# Patient Record
Sex: MALE | Race: WHITE | HISPANIC OR LATINO
[De-identification: names, ages, dates, MRNs, and addresses within clinical notes are randomized per-mention and may not be internally consistent; named-entity substitution may affect disease eponyms.]

---

## 2022-04-21 PROBLEM — Z00.00 ENCOUNTER FOR PREVENTIVE HEALTH EXAMINATION: Status: ACTIVE | Noted: 2022-04-21

## 2022-04-27 ENCOUNTER — APPOINTMENT (OUTPATIENT)
Dept: CARDIOLOGY | Facility: CLINIC | Age: 62
End: 2022-04-27
Payer: MEDICAID

## 2022-04-27 ENCOUNTER — NON-APPOINTMENT (OUTPATIENT)
Age: 62
End: 2022-04-27

## 2022-04-27 VITALS
WEIGHT: 191 LBS | SYSTOLIC BLOOD PRESSURE: 130 MMHG | BODY MASS INDEX: 28.95 KG/M2 | OXYGEN SATURATION: 98 % | TEMPERATURE: 98.4 F | HEART RATE: 73 BPM | HEIGHT: 68 IN | DIASTOLIC BLOOD PRESSURE: 72 MMHG

## 2022-04-27 VITALS — DIASTOLIC BLOOD PRESSURE: 70 MMHG | SYSTOLIC BLOOD PRESSURE: 132 MMHG

## 2022-04-27 DIAGNOSIS — Z84.89 FAMILY HISTORY OF OTHER SPECIFIED CONDITIONS: ICD-10-CM

## 2022-04-27 DIAGNOSIS — Z83.3 FAMILY HISTORY OF DIABETES MELLITUS: ICD-10-CM

## 2022-04-27 DIAGNOSIS — Z80.9 FAMILY HISTORY OF MALIGNANT NEOPLASM, UNSPECIFIED: ICD-10-CM

## 2022-04-27 DIAGNOSIS — Z86.79 PERSONAL HISTORY OF OTHER DISEASES OF THE CIRCULATORY SYSTEM: ICD-10-CM

## 2022-04-27 DIAGNOSIS — Z86.39 PERSONAL HISTORY OF OTHER ENDOCRINE, NUTRITIONAL AND METABOLIC DISEASE: ICD-10-CM

## 2022-04-27 DIAGNOSIS — Z78.9 OTHER SPECIFIED HEALTH STATUS: ICD-10-CM

## 2022-04-27 DIAGNOSIS — Z87.438 PERSONAL HISTORY OF OTHER DISEASES OF MALE GENITAL ORGANS: ICD-10-CM

## 2022-04-27 PROCEDURE — 93000 ELECTROCARDIOGRAM COMPLETE: CPT

## 2022-04-27 PROCEDURE — 99203 OFFICE O/P NEW LOW 30 MIN: CPT

## 2022-04-27 RX ORDER — MULTIVITAMIN
TABLET ORAL DAILY
Qty: 30 | Refills: 2 | Status: ACTIVE | COMMUNITY
Start: 2022-04-27

## 2022-04-27 RX ORDER — TAMSULOSIN HYDROCHLORIDE 0.4 MG/1
0.4 CAPSULE ORAL
Qty: 30 | Refills: 5 | Status: ACTIVE | COMMUNITY
Start: 2022-04-27

## 2022-04-27 NOTE — REASON FOR VISIT
[Symptom and Test Evaluation] : symptom and test evaluation [FreeTextEntry1] : Establish care for HTN

## 2022-04-27 NOTE — DISCUSSION/SUMMARY
[FreeTextEntry1] : Mr. ERASMO GOMEZ is a 61 year male with metabolic Sdme. No prior history of CAD.\par At the present time he is completely asymptomatic.\par I have recommended to continue the same medications.\par I also recommended lifestyle modification with weight loss and exercise.\par We will perform a nuclear stress test and an echocardiogram to assess for ischemia, left ventricular and valvular function.\par We will perform routine laboratory tests\par Routine follow up in 6 months\par

## 2022-04-27 NOTE — ASSESSMENT
[FreeTextEntry1] : ECG performed today at the office revealed a NSR 74 bpm, RSR in V1, with normal AQRS, CO, QRS and QTc.\par

## 2022-04-27 NOTE — HISTORY OF PRESENT ILLNESS
[FreeTextEntry1] : 62 yo man, , father of 4, Turkish speaking from [facility].  as a profession. No prior history of heart disease, has known HTN since the age of 49, has DM 2 treated with insulin for the last 4 years. Has been treated for hyperlipidemia. \par Had a stress test and TTE in the past, more than 4-5 years. All tests at the time were normal (?).\par Has noticed that when he does exercise, he gets SOB, NYHA 2/4. Denies CP, orthopnea, PND, palpitations or ankle swelling.\par HTN treated with ARB/ HCTZ\par DM 2 since age 44, treated with insulin since age 47\par HLD treated with statins\par No surgeries.\par Doesnt smoke\par Occasional alcohol\par Denies the use of illicit drugs\par Received the COVID 19 vaccine x 3\par No family history of IHD

## 2022-05-09 ENCOUNTER — APPOINTMENT (OUTPATIENT)
Dept: CARDIOLOGY | Facility: CLINIC | Age: 62
End: 2022-05-09
Payer: MEDICAID

## 2022-05-09 PROCEDURE — 93306 TTE W/DOPPLER COMPLETE: CPT

## 2022-05-17 ENCOUNTER — APPOINTMENT (OUTPATIENT)
Dept: CARDIOLOGY | Facility: CLINIC | Age: 62
End: 2022-05-17

## 2022-07-12 ENCOUNTER — APPOINTMENT (OUTPATIENT)
Dept: CARDIOLOGY | Facility: CLINIC | Age: 62
End: 2022-07-12

## 2022-07-12 PROCEDURE — 78452 HT MUSCLE IMAGE SPECT MULT: CPT

## 2022-07-12 PROCEDURE — 93015 CV STRESS TEST SUPVJ I&R: CPT

## 2022-07-12 PROCEDURE — A9500: CPT

## 2022-07-19 ENCOUNTER — APPOINTMENT (OUTPATIENT)
Dept: CARDIOLOGY | Facility: CLINIC | Age: 62
End: 2022-07-19

## 2022-07-19 VITALS
HEIGHT: 68 IN | BODY MASS INDEX: 28.95 KG/M2 | TEMPERATURE: 97.9 F | HEART RATE: 69 BPM | OXYGEN SATURATION: 96 % | DIASTOLIC BLOOD PRESSURE: 78 MMHG | WEIGHT: 191 LBS | SYSTOLIC BLOOD PRESSURE: 137 MMHG

## 2022-07-19 PROCEDURE — 99212 OFFICE O/P EST SF 10 MIN: CPT

## 2022-07-19 NOTE — DISCUSSION/SUMMARY
[FreeTextEntry1] : Mr. ERASMO GOMEZ is a 61 year male with metabolic Sdme. No prior history of CAD.\par At the present time he is completely asymptomatic.\par Had a stress test and echo that were both WNL.\par Blood work revealed elevated JvU6c=2.5% and reduced GFR (51 ml/min)\par I have recommended to continue the same medications.\par I also recommended lifestyle modification with weight loss and exercise.\par Routine follow up in 6 months\par

## 2022-07-19 NOTE — HISTORY OF PRESENT ILLNESS
[FreeTextEntry1] : 62 yo man, , father of 4, [place] speaking from [facility].  as a profession. No prior history of heart disease, has known HTN since the age of 49, has DM 2 treated with insulin for the last 4 years. Has been treated for hyperlipidemia. \par Had a stress test and TTE in the past, more than 4-5 years. All tests at the time were normal (?).\par On 7/11/2022 he had a nuclear stress test 11 METS normal LVEF and no ischemia.\par Echo done in 5/2022 was also WNL\par Has noticed that when he does exercise, he gets SOB, NYHA 2/4. Denies CP, orthopnea, PND, palpitations or ankle swelling.\par HTN treated with ARB/ HCTZ\par DM 2 since age 44, treated with insulin since age 47. Last HbA1c in 5/2022 was 8.5%\par Hyperlipidemia treated with statins. Lpa is also elevated 133 nmol/L (Nl<75)\par Mild CKD with a GFR=52 ml/min\par No surgeries.\par Doesnt smoke\par Occasional alcohol\par Denies the use of illicit drugs\par Received the COVID 19 vaccine x 3\par No family history of IHD

## 2023-02-01 ENCOUNTER — NON-APPOINTMENT (OUTPATIENT)
Age: 63
End: 2023-02-01

## 2023-02-01 ENCOUNTER — APPOINTMENT (OUTPATIENT)
Dept: CARDIOLOGY | Facility: CLINIC | Age: 63
End: 2023-02-01
Payer: MEDICAID

## 2023-02-01 VITALS
BODY MASS INDEX: 29.1 KG/M2 | HEIGHT: 68 IN | SYSTOLIC BLOOD PRESSURE: 138 MMHG | TEMPERATURE: 98 F | OXYGEN SATURATION: 97 % | WEIGHT: 192 LBS | HEART RATE: 71 BPM | DIASTOLIC BLOOD PRESSURE: 76 MMHG

## 2023-02-01 PROCEDURE — 99212 OFFICE O/P EST SF 10 MIN: CPT | Mod: 25

## 2023-02-01 PROCEDURE — 93000 ELECTROCARDIOGRAM COMPLETE: CPT

## 2023-02-01 RX ORDER — LOSARTAN POTASSIUM 50 MG/1
50 TABLET, FILM COATED ORAL DAILY
Qty: 30 | Refills: 0 | Status: DISCONTINUED | COMMUNITY
Start: 2022-04-27 | End: 2023-02-01

## 2023-02-01 NOTE — HISTORY OF PRESENT ILLNESS
[FreeTextEntry1] : 63 yo man, , father of 4, Faroese speaking from [facility].  as a profession. No prior history of heart disease, has known HTN since the age of 49, has DM 2 treated with insulin for the last 4 years. Has been treated for hyperlipidemia. \par Had a stress test and TTE in the past, more than 4-5 years. All tests at the time were normal (?).\par On 7/11/2022 he had a nuclear stress test 11 METS normal LVEF and no ischemia.\par Echo done in 5/2022 was also WNL\par Has noticed that when he does exercise, he gets SOB, NYHA 2/4. Denies CP, orthopnea, PND, palpitations or ankle swelling.\par Has been having trouble sleeping lately. \par HTN treated with ARB/ HCTZ\par DM 2 since age 44, treated with insulin since age 47. Last HbA1c in 5/2022 was 8.5%\par Hyperlipidemia treated with statins. Lpa is also elevated 133 nmol/L (Nl<75)\par Mild CKD with a GFR=52 ml/min\par No surgeries.\par Doesnt smoke\par Occasional alcohol\par Denies the use of illicit drugs\par Received the COVID 19 vaccine x 3\par No family history of IHD

## 2023-02-01 NOTE — ASSESSMENT
[FreeTextEntry1] : ECG performed today at the office revealed a NSR, RSR in V1, with normal AQRS, NE, QRS and QTc.\par

## 2023-02-01 NOTE — DISCUSSION/SUMMARY
[FreeTextEntry1] : Mr. ERASMO GOMEZ is a 62 year male with metabolic Sdme. No prior history of CAD.\par At the present time he is completely asymptomatic.\par Had a stress test and echo that were both WNL.\par Blood work revealed elevated MaL4m=3.5% and reduced GFR (51 ml/min)\par I have recommended to continue the same medications.\par I also recommended lifestyle modification with weight loss and exercise.\par Routine follow up in 6 months\par

## 2023-05-02 ENCOUNTER — APPOINTMENT (OUTPATIENT)
Dept: ENDOCRINOLOGY | Facility: CLINIC | Age: 63
End: 2023-05-02
Payer: MEDICAID

## 2023-05-02 VITALS
DIASTOLIC BLOOD PRESSURE: 75 MMHG | OXYGEN SATURATION: 97 % | HEART RATE: 70 BPM | HEIGHT: 68 IN | WEIGHT: 193 LBS | RESPIRATION RATE: 15 BRPM | TEMPERATURE: 98 F | BODY MASS INDEX: 29.25 KG/M2 | SYSTOLIC BLOOD PRESSURE: 146 MMHG

## 2023-05-02 LAB — HBA1C MFR BLD HPLC: 7.2

## 2023-05-02 PROCEDURE — 83036 HEMOGLOBIN GLYCOSYLATED A1C: CPT | Mod: QW

## 2023-05-02 PROCEDURE — T1013A: CUSTOM

## 2023-05-02 PROCEDURE — 99205 OFFICE O/P NEW HI 60 MIN: CPT | Mod: 25

## 2023-05-02 RX ORDER — INSULIN GLARGINE 100 [IU]/ML
100 INJECTION, SOLUTION SUBCUTANEOUS AS DIRECTED
Refills: 0 | Status: COMPLETED | COMMUNITY
Start: 2022-04-27 | End: 2023-05-02

## 2023-05-03 LAB
CREAT SPEC-SCNC: 107 MG/DL
MICROALBUMIN 24H UR DL<=1MG/L-MCNC: 2.8 MG/DL
MICROALBUMIN/CREAT 24H UR-RTO: 26 MG/G

## 2023-08-30 ENCOUNTER — APPOINTMENT (OUTPATIENT)
Dept: CARDIOLOGY | Facility: CLINIC | Age: 63
End: 2023-08-30
Payer: MEDICAID

## 2023-08-30 VITALS
HEIGHT: 68 IN | HEART RATE: 71 BPM | OXYGEN SATURATION: 96 % | BODY MASS INDEX: 27.58 KG/M2 | SYSTOLIC BLOOD PRESSURE: 142 MMHG | DIASTOLIC BLOOD PRESSURE: 68 MMHG | WEIGHT: 182 LBS | TEMPERATURE: 98 F

## 2023-08-30 PROCEDURE — 99213 OFFICE O/P EST LOW 20 MIN: CPT

## 2023-08-30 RX ORDER — GLIMEPIRIDE 4 MG/1
4 TABLET ORAL
Qty: 90 | Refills: 2 | Status: DISCONTINUED | COMMUNITY
Start: 2022-04-27 | End: 2023-08-30

## 2023-08-30 RX ORDER — IRBESARTAN 150 MG/1
150 TABLET ORAL DAILY
Refills: 0 | Status: ACTIVE | COMMUNITY

## 2023-08-30 NOTE — HISTORY OF PRESENT ILLNESS
[FreeTextEntry1] : 62 yo man, , father of 4, English speaking from [facility].  as a profession. No prior history of heart disease, has known HTN since the age of 49, has DM 2 treated with insulin for the last 4 years. Has been treated for hyperlipidemia.  Had a stress test and TTE in the past, more than 4-5 years. All tests at the time were normal (?). On 7/11/2022 he had a nuclear stress test 11 METS normal LVEF and no ischemia. Echo done in 5/2022 was also WNL Has noticed that when he does exercise, he gets SOB, NYHA 2/4. Denies CP, orthopnea, PND, palpitations or ankle swelling. Ocasional dizziness when standing up suddenly.  Has been having trouble sleeping lately.  HTN treated with ARB/ HCTZ DM 2 since age 44, treated with insulin since age 47. Last HbA1c in 5/2022 was 8.5% Hyperlipidemia treated with statins. Lpa is also elevated 133 nmol/L (Nl<75) Mild CKD with a GFR=52 ml/min No surgeries. Doesnt smoke Occasional alcohol Denies the use of illicit drugs Received the COVID 19 vaccine x 3 No family history of IHD

## 2023-08-30 NOTE — DISCUSSION/SUMMARY
[FreeTextEntry1] : Mr. ERASMO GOMEZ is a 63-year male with metabolic Sdme. No prior history of CAD. At the present time he is completely asymptomatic. Occasional dizziness when standing up. Had a stress test and echo that were both WNL. I have recommended to continue the same medications. I also recommended lifestyle modification with weight loss and exercise. Routine follow up in 6 months.

## 2023-08-30 NOTE — ASSESSMENT
[FreeTextEntry1] : ECG performed today at the office revealed a NSR 66 bpm, RSR in V1, with normal AQRS, MT, QRS and QTc.

## 2023-09-07 ENCOUNTER — APPOINTMENT (OUTPATIENT)
Dept: ENDOCRINOLOGY | Facility: CLINIC | Age: 63
End: 2023-09-07
Payer: MEDICAID

## 2023-09-07 VITALS
BODY MASS INDEX: 27.89 KG/M2 | HEART RATE: 86 BPM | DIASTOLIC BLOOD PRESSURE: 67 MMHG | SYSTOLIC BLOOD PRESSURE: 119 MMHG | HEIGHT: 68 IN | RESPIRATION RATE: 14 BRPM | TEMPERATURE: 98.1 F | OXYGEN SATURATION: 97 % | WEIGHT: 184 LBS

## 2023-09-07 DIAGNOSIS — E66.3 OVERWEIGHT: ICD-10-CM

## 2023-09-07 PROCEDURE — T1013A: CUSTOM

## 2023-09-07 PROCEDURE — 99214 OFFICE O/P EST MOD 30 MIN: CPT | Mod: 25

## 2023-09-07 RX ORDER — INSULIN GLARGINE 100 [IU]/ML
100 INJECTION, SOLUTION SUBCUTANEOUS
Qty: 3 | Refills: 1 | Status: ACTIVE | COMMUNITY

## 2023-09-07 RX ORDER — FLASH GLUCOSE SENSOR
KIT MISCELLANEOUS
Qty: 2 | Refills: 3 | Status: ACTIVE | COMMUNITY
Start: 2023-05-02 | End: 1900-01-01

## 2023-09-07 NOTE — PHYSICAL EXAM
[Alert] : alert [Well Nourished] : well nourished [No Acute Distress] : no acute distress [Normal Hearing] : hearing was normal [No Neck Mass] : no neck mass was observed [Normal Rate] : heart rate was normal [Not Tender] : non-tender [Normal Gait] : normal gait [No Rash] : no rash [Right foot was examined, including] : right foot ~C was examined, including visual inspection with sensory and pulse exams [Left foot was examined, including] : left foot ~C was examined, including visual inspection with sensory and pulse exams [No Tremors] : no tremors [Oriented x3] : oriented to person, place, and time [Normal Affect] : the affect was normal [Normal Mood] : the mood was normal

## 2023-09-07 NOTE — REASON FOR VISIT
[Follow - Up] : a follow-up visit [DM Type 2] : DM Type 2 [[employer] ] : provided by [employer]   [Time Spent: ____ minutes] : Total time spent using  services: [unfilled] minutes. The patient's primary language is not English thus required  services. [Interpreters_IDNumber] : [id number]

## 2023-09-07 NOTE — DATA REVIEWED
[FreeTextEntry1] : [facility] labs from May 2022 reviewed.  Aug 2023 [facility]: TSH 1.25, Calcium 9.1

## 2023-09-07 NOTE — REVIEW OF SYSTEMS
Rx sent in  Per Marifer's note below, please contact homecare to  the medication  Thanks   [Negative] : Heme/Lymph

## 2023-09-07 NOTE — HISTORY OF PRESENT ILLNESS
[FreeTextEntry1] :  is presenting for follow up care for his Diabetes.   used.   63 year old male with PMH of Diabetic retinopathy, CKD stage 4, HLD, mild anemia, Type 2 Diabetes since , BMI 29.  Reports no history of nephropathy or neuropathy.  Reports no history of cardiovascular risk factors, no history of CAD, CHF or CVA in the past. Evaluated by cardiologist end of .   Current DM meds: Glimepiride 4mg QD, Metformin 1000mg BID, Ozempic 2mg weekly, Glargine 15 units HS (3 to 4 years) Prior DM meds: Was on short acting insulin  Other pertinent meds:   POCT A1c%: 8.5% May 2022 --> 7.2% 2023 --> 7.3% 2023  POCT B, fasting at home   FSG: twice daily. Glucometer.  Pre-Breakfast: 98, 120, 150 Pre-Lunch:  Pre-Dinner:  Bedtime: 140 or 130 Hypoglycemic episodes: 70s (has symptoms of blurry vision) around 2-3am  Diet:  Breakfast: eggs, coffee, tortilla  Lunch: sandwiches, fast food or salad  Dinner: Tries to eat healthy  Snacks: No juice or soda. Fruit: orange, apple, banana  Exercise:  Urine micro: Mat  wnl ACE: irbesarten  C-peptide:  Cr: 1.20 GFR: 68 Aug 2023  Lipid profile: LDL 67, TGs 183 Aug 2023 Statin: atorvastatin 10mg  HbA1c%: 7.3%  Ophthalmology: Sees retinal specialist, gets injections in eye monthly due to HTN/DR.  Neuropathy:  Podiatry/Diabetic foot exam:     Interval hx:  Pt c/o dizziness, PCP lowered Glargine further from 18 to 15 units HS.

## 2023-09-07 NOTE — ASSESSMENT
[Diabetes Foot Care] : diabetes foot care [Long Term Vascular Complications] : long term vascular complications of diabetes [Carbohydrate Consistent Diet] : carbohydrate consistent diet [Importance of Diet and Exercise] : importance of diet and exercise to improve glycemic control, achieve weight loss and improve cardiovascular health [Exercise/Effect on Glucose] : exercise/effect on glucose [Hypoglycemia Management] : hypoglycemia management [Ketone Testing] : ketone testing [Action and use of Insulin] : action and use of short and long-acting insulin [Self Monitoring of Blood Glucose] : self monitoring of blood glucose [Insulin Self-Administration] : insulin self-administration [Injection Technique, Storage, Sharps Disposal] : injection technique, storage, and sharps disposal [Retinopathy Screening] : Patient was referred to ophthalmology for retinopathy screening [Diabetic Medications] : Risks and benefits of diabetic medications were discussed [FreeTextEntry1] : 62 year old male with PMH of Diabetic retinopathy, CKD stage 4, HLD, mild anemia, Type 2 Diabetes since 1995, BMI 29 is presenting for follow up care for his Diabetes.   1. Type 2 diabetes mellitus. Point-of-care HbA1c 7.2% April 2023 --> 7.3% Aug 2023  -We discussed the cardiovascular and microvascular complications of uncontrolled diabetes. We discussed the importance of diet and exercise and lifestyle modification for glycemic control and weight loss. We discussed referral to our diabetes educator and nutrition. We discussed pharmacologic options for glycemic control and weight loss.  -Restart Glimepiride but lower dose 2mg QD -Continue Lantus 15 units HS (Will try to continue to wean off) -Continue Ozempic 2mg weekly. No h/o MTC, MEN2 or pancreatitis. GI s/e discussed.  -Continue Metformin 1000mg BID -Due to 2-3am hypoglycemia, can decrease Glargine from 20 to 18 units HS  -Opthal UTD  -Urine ACR wnl  -Continue statin for HLD. LDL at goal.  -Foot care discussed  Patient verbalized understanding of the above. All questions were answered to patient's satisfaction. Dispo: Patient will follow up in 4 months.

## 2024-01-09 ENCOUNTER — APPOINTMENT (OUTPATIENT)
Dept: ENDOCRINOLOGY | Facility: CLINIC | Age: 64
End: 2024-01-09

## 2024-01-10 ENCOUNTER — APPOINTMENT (OUTPATIENT)
Dept: ENDOCRINOLOGY | Facility: CLINIC | Age: 64
End: 2024-01-10

## 2024-02-02 ENCOUNTER — RX RENEWAL (OUTPATIENT)
Age: 64
End: 2024-02-02

## 2024-02-02 RX ORDER — GLIMEPIRIDE 2 MG/1
2 TABLET ORAL
Qty: 30 | Refills: 5 | Status: ACTIVE | COMMUNITY
Start: 2023-09-07 | End: 1900-01-01

## 2024-03-13 ENCOUNTER — NON-APPOINTMENT (OUTPATIENT)
Age: 64
End: 2024-03-13

## 2024-03-13 ENCOUNTER — APPOINTMENT (OUTPATIENT)
Dept: CARDIOLOGY | Facility: CLINIC | Age: 64
End: 2024-03-13
Payer: MEDICAID

## 2024-03-13 VITALS — HEART RATE: 77 BPM | OXYGEN SATURATION: 97 % | DIASTOLIC BLOOD PRESSURE: 74 MMHG | SYSTOLIC BLOOD PRESSURE: 130 MMHG

## 2024-03-13 VITALS — BODY MASS INDEX: 28.64 KG/M2 | WEIGHT: 189 LBS | HEIGHT: 68 IN

## 2024-03-13 PROCEDURE — 99213 OFFICE O/P EST LOW 20 MIN: CPT

## 2024-03-13 NOTE — DISCUSSION/SUMMARY
[FreeTextEntry1] : Mr. ERASMO GOMEZ is a 63-year male with metabolic Sdme. No prior history of CAD. Has DM2 on insulin, HTN, HLD and overweight (Met Sdme). Therefore, will assess for CAD with a CTCA.  Occasional dizziness when standing up. I have recommended to continue the same medications. I also recommended lifestyle modification with weight loss and exercise. Routine follow up in 6 months.

## 2024-03-13 NOTE — ASSESSMENT
[FreeTextEntry1] : ECG performed today at the office revealed a NSR 72 bpm, RSR in V1, with normal AQRS, UT, QRS and QTc.

## 2024-03-13 NOTE — HISTORY OF PRESENT ILLNESS
[FreeTextEntry1] : 62 yo man, , father of 4, [place] speaking from [facility].  as a profession. No prior history of heart disease, has known HTN since the age of 49, has DM 2 treated with insulin for the last 4 years. Has been treated for hyperlipidemia.  Had a stress test and TTE in the past, more than 4-5 years. All tests at the time were normal (?). On 7/11/2022 he had a nuclear stress test 11 METS normal LVEF and no ischemia. Echo done in 5/2022 was also WNL. LVEFWNL Has noticed that when he does exercise, he gets SOB, NYHA 2/4. Denies CP, orthopnea, PND, palpitations or ankle swelling. Ocasional dizziness when standing up suddenly.  Has been having trouble sleeping lately.  HTN treated with ARB/ HCTZ DM 2 since age 44, treated with insulin since age 47. Last HbA1c in 5/2022 was 8.5% Hyperlipidemia treated with statins. Lpa is also elevated 133 nmol/L (Nl<75) Mild CKD with a GFR=52 ml/min No surgeries. Doesnt smoke Occasional alcohol Denies the use of illicit drugs Received the COVID 19 vaccine x 3 No family history of IHD

## 2024-03-13 NOTE — PHYSICAL EXAM
[Well Developed] : well developed [Well Nourished] : well nourished [No Acute Distress] : no acute distress [Normal Venous Pressure] : normal venous pressure [Normal Conjunctiva] : normal conjunctiva [No Carotid Bruit] : no carotid bruit [No Murmur] : no murmur [Normal S1, S2] : normal S1, S2 [No Gallop] : no gallop [No Rub] : no rub [Good Air Entry] : good air entry [Clear Lung Fields] : clear lung fields [Soft] : abdomen soft [No Respiratory Distress] : no respiratory distress  [Non Tender] : non-tender [No Masses/organomegaly] : no masses/organomegaly [Normal Gait] : normal gait [Normal Bowel Sounds] : normal bowel sounds [No Cyanosis] : no cyanosis [No Edema] : no edema [No Clubbing] : no clubbing [No Rash] : no rash [No Varicosities] : no varicosities [No Skin Lesions] : no skin lesions [Moves all extremities] : moves all extremities [Normal Speech] : normal speech [No Focal Deficits] : no focal deficits [Normal memory] : normal memory [Alert and Oriented] : alert and oriented

## 2024-04-08 ENCOUNTER — APPOINTMENT (OUTPATIENT)
Dept: CT IMAGING | Facility: CLINIC | Age: 64
End: 2024-04-08

## 2024-04-29 ENCOUNTER — APPOINTMENT (OUTPATIENT)
Dept: CT IMAGING | Facility: CLINIC | Age: 64
End: 2024-04-29
Payer: MEDICAID

## 2024-04-29 PROCEDURE — 75574 CT ANGIO HRT W/3D IMAGE: CPT

## 2024-05-01 ENCOUNTER — APPOINTMENT (OUTPATIENT)
Dept: CARDIOLOGY | Facility: CLINIC | Age: 64
End: 2024-05-01
Payer: MEDICAID

## 2024-05-01 VITALS
BODY MASS INDEX: 28.64 KG/M2 | WEIGHT: 189 LBS | DIASTOLIC BLOOD PRESSURE: 70 MMHG | HEIGHT: 68 IN | TEMPERATURE: 97.5 F | SYSTOLIC BLOOD PRESSURE: 132 MMHG | OXYGEN SATURATION: 96 % | HEART RATE: 70 BPM

## 2024-05-01 PROCEDURE — 99213 OFFICE O/P EST LOW 20 MIN: CPT

## 2024-05-01 NOTE — HISTORY OF PRESENT ILLNESS
[FreeTextEntry1] : 64 yo man, , father of 4, Northern Irish speaking from [facility].  as a profession. No prior history of heart disease, has known HTN since the age of 49, has DM 2 treated with insulin for the last 4 years. Has been treated for hyperlipidemia.  Had a stress test and TTE in the past, more than 4-5 years. All tests at the time were normal (?). On 7/11/2022 he had a nuclear stress test 11 METS normal LVEF and no ischemia. Echo done in 5/2022 was also WNL. LVEF WNL Has noticed that when he does exercise, he gets SOB, NYHA 2/4. Denies CP, orthopnea, PND, palpitations or ankle swelling. Occasional dizziness when standing up suddenly.  On 4/29/2024 he had a CTCA that revealed multifocal plaque with significant stenoses of proximal LAD (moderate to severe), proximal LCx (at least moderate), and distal RCA (borderline mild to moderate). Obtuse marginal artery plaque beyond branch point and proximal ramus intermedius artery plaque are difficult to quantify on this study. Ca score was 176 AU.  Has been having trouble sleeping lately.  HTN treated with ARB/ HCTZ DM 2 since age 44, treated with insulin since age 47. Last HbA1c in 5/2022 was 8.5% Hyperlipidemia treated with statins. Lpa is also elevated 133 nmol/L (Nl<75) Mild CKD with a GFR=52 ml/min No surgeries. Doesnt smoke Occasional alcohol Denies the use of illicit drugs Received the COVID 19 vaccine x 3 No family history of IHD

## 2024-05-01 NOTE — DISCUSSION/SUMMARY
[FreeTextEntry1] : Mr. ERASMO GOMEZ is a 63-year male with metabolic Sdme. No prior history of CAD. Has DM2 on insulin, HTN, HLD and overweight (Met Sdme). Underwent CTCA in 4/29/2024 that revealed multifocal plaque with significant stenoses of proximal LAD (moderate to severe), proximal LCx (at least moderate), and distal RCA (borderline mild to moderate). Therefore, we will recommend to perform a cardiac catheterization to assess these stenoses and the need for further intervention/revascularization.  I have recommended to continue the same medications. Routine follow up in 3 months.

## 2024-05-29 ENCOUNTER — APPOINTMENT (OUTPATIENT)
Dept: ENDOCRINOLOGY | Facility: CLINIC | Age: 64
End: 2024-05-29
Payer: MEDICAID

## 2024-05-29 VITALS
HEART RATE: 78 BPM | SYSTOLIC BLOOD PRESSURE: 110 MMHG | WEIGHT: 188 LBS | OXYGEN SATURATION: 99 % | DIASTOLIC BLOOD PRESSURE: 70 MMHG | BODY MASS INDEX: 28.49 KG/M2 | HEIGHT: 68 IN

## 2024-05-29 DIAGNOSIS — E11.9 TYPE 2 DIABETES MELLITUS W/OUT COMPLICATIONS: ICD-10-CM

## 2024-05-29 DIAGNOSIS — Z79.4 TYPE 2 DIABETES MELLITUS W/OUT COMPLICATIONS: ICD-10-CM

## 2024-05-29 PROCEDURE — 99215 OFFICE O/P EST HI 40 MIN: CPT | Mod: 25

## 2024-05-29 PROCEDURE — 82962 GLUCOSE BLOOD TEST: CPT

## 2024-05-29 PROCEDURE — G2211 COMPLEX E/M VISIT ADD ON: CPT | Mod: NC,1L

## 2024-05-29 PROCEDURE — 83036 HEMOGLOBIN GLYCOSYLATED A1C: CPT | Mod: QW

## 2024-05-29 RX ORDER — METFORMIN HYDROCHLORIDE 1000 MG/1
1000 TABLET, COATED ORAL
Qty: 60 | Refills: 5 | Status: ACTIVE | COMMUNITY
Start: 2022-04-27 | End: 1900-01-01

## 2024-05-29 RX ORDER — MECOBALAMIN 5000 MCG
LOZENGE ORAL
Refills: 0 | Status: ACTIVE | COMMUNITY

## 2024-05-29 RX ORDER — SEMAGLUTIDE 2.68 MG/ML
8 INJECTION, SOLUTION SUBCUTANEOUS
Qty: 1 | Refills: 6 | Status: ACTIVE | COMMUNITY
Start: 2022-04-27 | End: 1900-01-01

## 2024-05-29 NOTE — HISTORY OF PRESENT ILLNESS
[FreeTextEntry1] : Mr. ERASMO GOMEZ  is a 63 year old male with past medical history of Diabetes Mellitus Type 2, CAD s/p JUANA, hypertension, hyperlipidemia who presents for management of his diabetes.  Patient denies any history of diabetic retinopathy, nephropathy or neuropathy. He denies any blurry vision, polyuria, polydipsia and numbness/tingling in the extremities.   POCT Glucose: 126 mg/dL POCT Hga1c: 6.8%   Diabetes first diagnosed:30 years Type:2  Current diabetic regimen: Glargine 18 units at bedtime Glimepiride 2 mg daily Metformin 1000 mg twice daily Ozempic 2 mg q. weekly  Other relevant medications: Atorvastatin 40 Irbesartan 150  Self monitoring blood glucose : Glucometer: 2x times per day  SMBG: Pre-breakfast:120s post-meals: 180s  Hypoglycemia:  Diet: Eating better  Exercise:  Urine micro: lipid profile: last hba1c:6.8% (5/2024) eye exam:Sees retinal specialist, gets injections in eye monthly due to HTN/DR diabetic foot exam/podiatry:9/2023

## 2024-05-29 NOTE — REASON FOR VISIT
[Initial Evaluation] : an initial evaluation [DM Type 2] : DM Type 2 [Interpreters_IDNumber] : [id number]

## 2024-05-29 NOTE — ASSESSMENT
[FreeTextEntry1] : 63 year old male with past medical history of Diabetes Mellitus Type 2, CAD, hypertension, hyperlipidemia who presents for management of his diabetes  1. DM 2- uncontrolled, uncomplicated Patient counseled on the importance of diabetic control and risk of complications. We discussed about microvascular disease and macrovascular disease. We discussed the importance of ophthalmology evaluations annually or more frequent as necessary. We also discussed the importance of diabetes foot care. Some form of glucose monitoring is always advised. Maintaining a low carbohydrate/ADA diet and physical activity was discussed. Patient's diet and the necessary changes discussed. Reviewed over freestyle christian and use. Reviewed over glycemic goals. Discussed other options for diabetes control. Suggested to try GLP-1. Reviewed mechanism of action, risks and benefits. Patient agrees to try.  Cont Glargine 18 units at bedtime Cont Glimepiride 2 mg daily Cont Metformin 1000 mg twice daily Cont Ozempic 2 mg q. weekly Check fsg BID Cont ADA diet Cont exercise Will refer to CDE/RD Opthalmology Visit up to date Foot Exam up to date   2. HLD- stable Cont Atorvastatin

## 2024-07-02 ENCOUNTER — APPOINTMENT (OUTPATIENT)
Dept: CARDIOLOGY | Facility: CLINIC | Age: 64
End: 2024-07-02
Payer: MEDICAID

## 2024-07-02 VITALS
OXYGEN SATURATION: 99 % | HEART RATE: 80 BPM | DIASTOLIC BLOOD PRESSURE: 72 MMHG | WEIGHT: 187 LBS | SYSTOLIC BLOOD PRESSURE: 140 MMHG | HEIGHT: 68 IN | BODY MASS INDEX: 28.34 KG/M2

## 2024-07-02 DIAGNOSIS — I25.10 ATHEROSCLEROTIC HEART DISEASE OF NATIVE CORONARY ARTERY W/OUT ANGINA PECTORIS: ICD-10-CM

## 2024-07-02 DIAGNOSIS — E78.2 MIXED HYPERLIPIDEMIA: ICD-10-CM

## 2024-07-02 DIAGNOSIS — I10 ESSENTIAL (PRIMARY) HYPERTENSION: ICD-10-CM

## 2024-07-02 PROCEDURE — 93000 ELECTROCARDIOGRAM COMPLETE: CPT

## 2024-07-02 PROCEDURE — 99205 OFFICE O/P NEW HI 60 MIN: CPT | Mod: 25

## 2024-07-02 RX ORDER — CLOPIDOGREL BISULFATE 75 MG/1
75 TABLET, FILM COATED ORAL DAILY
Qty: 90 | Refills: 3 | Status: ACTIVE | COMMUNITY
Start: 1900-01-01 | End: 1900-01-01

## 2024-07-02 RX ORDER — ATORVASTATIN CALCIUM 80 MG/1
80 TABLET, FILM COATED ORAL DAILY
Refills: 0 | Status: ACTIVE | COMMUNITY

## 2024-08-07 ENCOUNTER — APPOINTMENT (OUTPATIENT)
Dept: CARDIOLOGY | Facility: CLINIC | Age: 64
End: 2024-08-07

## 2024-09-09 ENCOUNTER — APPOINTMENT (OUTPATIENT)
Dept: CARDIOLOGY | Facility: CLINIC | Age: 64
End: 2024-09-09

## 2024-09-11 ENCOUNTER — APPOINTMENT (OUTPATIENT)
Dept: CARDIOLOGY | Facility: CLINIC | Age: 64
End: 2024-09-11

## 2024-10-02 ENCOUNTER — APPOINTMENT (OUTPATIENT)
Dept: CARDIOLOGY | Facility: CLINIC | Age: 64
End: 2024-10-02

## 2024-10-16 ENCOUNTER — APPOINTMENT (OUTPATIENT)
Dept: ENDOCRINOLOGY | Facility: CLINIC | Age: 64
End: 2024-10-16

## 2025-03-03 NOTE — ASSESSMENT
[FreeTextEntry1] : ECG performed today at the office revealed a NSR 72 bpm, RSR in V1, with normal AQRS, CA, QRS and QTc. 
Resident